# Patient Record
Sex: MALE | Race: WHITE | Employment: STUDENT | URBAN - METROPOLITAN AREA
[De-identification: names, ages, dates, MRNs, and addresses within clinical notes are randomized per-mention and may not be internally consistent; named-entity substitution may affect disease eponyms.]

---

## 2017-01-13 ENCOUNTER — TELEPHONE (OUTPATIENT)
Dept: INTERNAL MEDICINE CLINIC | Age: 27
End: 2017-01-13

## 2017-06-01 ENCOUNTER — OFFICE VISIT (OUTPATIENT)
Dept: INTERNAL MEDICINE CLINIC | Age: 27
End: 2017-06-01

## 2017-06-01 VITALS
OXYGEN SATURATION: 99 % | HEIGHT: 71 IN | WEIGHT: 185 LBS | SYSTOLIC BLOOD PRESSURE: 136 MMHG | TEMPERATURE: 97.4 F | HEART RATE: 75 BPM | DIASTOLIC BLOOD PRESSURE: 81 MMHG | BODY MASS INDEX: 25.9 KG/M2 | RESPIRATION RATE: 18 BRPM

## 2017-06-01 DIAGNOSIS — R03.0 BORDERLINE HYPERTENSION: Primary | Chronic | ICD-10-CM

## 2017-06-01 NOTE — PROGRESS NOTES
HISTORY OF PRESENT ILLNESS  Caro Patino is a 32 y.o. male. HPI  . Problem follow up:  Caro Patino returns for follow up visit regarding borderline HTN. he was seen 6 months ago in office diagnosed with same and treated with lifestyle mod, . Workup was significant for   Lab Results   Component Value Date/Time    Sodium 139 01/04/2017 07:51 AM    Potassium 4.7 01/04/2017 07:51 AM    Chloride 98 01/04/2017 07:51 AM    CO2 26 01/04/2017 07:51 AM    Glucose 91 01/04/2017 07:51 AM    BUN 12 01/04/2017 07:51 AM    Creatinine 0.86 01/04/2017 07:51 AM    BUN/Creatinine ratio 14 01/04/2017 07:51 AM    GFR est  01/04/2017 07:51 AM    GFR est non- 01/04/2017 07:51 AM    Calcium 9.8 01/04/2017 07:51 AM     .  Notes, labs, studies, imaging related to this problem during prior visit were available . Since that visit, he has not changed. he has been compliant with prescribed treatment. Residual symptoms include: none. No chest pain, shortness of breath, dizziness, edema, headaches  New issues associated with this problem include: none. BP's at home earlier in year were 130's/70-80's    ROS    Physical Exam  Visit Vitals    /81 (BP 1 Location: Left arm, BP Patient Position: Sitting)    Pulse 75    Temp 97.4 °F (36.3 °C) (Oral)    Resp 18    Ht 5' 11\" (1.803 m)    Wt 185 lb (83.9 kg)    SpO2 99%    BMI 25.8 kg/m2       ASSESSMENT and PLAN  Zhanna Wright was seen today for elevated blood pressure. Diagnoses and all orders for this visit:    Borderline hypertension  The patient is advised to continue progressive daily aerobic exercise program and reduce salt in diet and cooking. Log blood pressures at home while sitting, relaxed 1 times monthly and bring to next visit. Pt educated on goal BP of 130/80 on average or lower.   Call office as soon as possible if BP's over 140/90 or below 110/50 on multiple occasions and/or with symptoms of dizziness, chest pain, shortness of breath, headache or ankle swelling. Recheck log and bp here in 6 month(s).       Follow-up Disposition:  Return in about 6 months (around 12/1/2017) for physical.

## 2017-06-01 NOTE — MR AVS SNAPSHOT
Visit Information Date & Time Provider Department Dept. Phone Encounter #  
 6/1/2017  7:45 AM Breana Anthony MD [facility] [address] [id number] Follow-up Instructions Return in about 6 months (around 12/1/2017) for physical.  
  
Upcoming Health Maintenance Date Due DTaP/Tdap/Td series (1 - Tdap) 1/29/2011 INFLUENZA AGE 9 TO ADULT 8/1/2017 Allergies as of 6/1/2017  Review Complete On: 12/1/2016 By: Breana Anthony MD  
 No Known Allergies Current Immunizations  Reviewed on 12/1/2016 Name Date Influenza Vaccine 10/26/2016 Not reviewed this visit You Were Diagnosed With   
  
 Codes Comments Borderline hypertension    -  Primary ICD-10-CM: R03.0 ICD-9-CM: 796.2 Vitals BP Pulse Temp Resp Height(growth percentile) Weight(growth percentile) 136/81 (BP 1 Location: Left arm, BP Patient Position: Sitting) 75 97.4 °F (36.3 °C) (Oral) 18 5' 11\" (1.803 m) 185 lb (83.9 kg) SpO2 BMI Smoking Status 99% 25.8 kg/m2 Never Smoker Vitals History BMI and BSA Data Body Mass Index Body Surface Area  
 25.8 kg/m 2 2.05 m 2 Preferred Pharmacy Pharmacy Name Phone [address] [facility] [address] AT [facility] [phone] Your Updated Medication List  
  
   
This list is accurate as of: 6/1/17  8:08 AM.  Always use your most recent med list.  
  
  
  
  
 Cetirizine 10 mg Cap Take 10 mg by mouth two (2) times a day. Indications: CHRONIC IDIOPATHIC URTICARIA  
  
 multivitamin tablet Commonly known as:  ONE A DAY Take 1 Tab by mouth daily. ZANTAC 150 mg tablet Generic drug:  raNITIdine Take 150 mg by mouth daily as needed for Indigestion. Follow-up Instructions Return in about 6 months (around 12/1/2017) for physical.  
  
  
Introducing [facility]! [employer] introduces Navidog patient portal. Now you can access parts of your medical record, email your doctor's office, and request medication refills online. 1. In your internet browser, go to [email or web address] 2. Click on the First Time User? Click Here link in the Sign In box. You will see the New Member Sign Up page. 3. Enter your Navidog Access Code exactly as it appears below. You will not need to use this code after youve completed the sign-up process. If you do not sign up before the expiration date, you must request a new code. · Navidog Access Code: [email or web address] Expires: 8/30/2017  8:07 AM 
 
4. Enter the last four digits of your Social Security Number (xxxx) and Date of Birth (mm/dd/yyyy) as indicated and click Submit. You will be taken to the next sign-up page. 5. Create a Navidog ID. This will be your Navidog login ID and cannot be changed, so think of one that is secure and easy to remember. 6. Create a Navidog password. You can change your password at any time. 7. Enter your Password Reset Question and Answer. This can be used at a later time if you forget your password. 8. Enter your e-mail address. You will receive e-mail notification when new information is available in [address] 9. Click Sign Up. You can now view and download portions of your medical record. 10. Click the Download Summary menu link to download a portable copy of your medical information. If you have questions, please visit the Frequently Asked Questions section of the Navidog website. Remember, Navidog is NOT to be used for urgent needs. For medical emergencies, dial 911. Now available from your iPhone and Android! Please provide this summary of care documentation to your next provider. Your primary care clinician is listed as Nahum Rapp. If you have any questions after today's visit, please call [phone].

## 2017-12-05 ENCOUNTER — OFFICE VISIT (OUTPATIENT)
Dept: INTERNAL MEDICINE CLINIC | Age: 27
End: 2017-12-05

## 2017-12-05 VITALS
TEMPERATURE: 98.7 F | HEART RATE: 59 BPM | BODY MASS INDEX: 26.62 KG/M2 | OXYGEN SATURATION: 98 % | DIASTOLIC BLOOD PRESSURE: 79 MMHG | SYSTOLIC BLOOD PRESSURE: 132 MMHG | HEIGHT: 71 IN | WEIGHT: 190.13 LBS | RESPIRATION RATE: 18 BRPM

## 2017-12-05 DIAGNOSIS — Z23 ENCOUNTER FOR IMMUNIZATION: ICD-10-CM

## 2017-12-05 DIAGNOSIS — Z00.00 PREVENTATIVE HEALTH CARE: Primary | ICD-10-CM

## 2017-12-05 NOTE — MR AVS SNAPSHOT
Visit Information Date & Time Provider Department Dept. Phone Encounter #  
 12/5/2017  9:15 AM Lea Cortez MD [facility] [address] [id number] Follow-up Instructions Return in about 6 months (around 6/5/2018). Upcoming Health Maintenance Date Due DTaP/Tdap/Td series (1 - Tdap) 1/29/2011 Influenza Age 5 to Adult 8/1/2017 Allergies as of 12/5/2017  Review Complete On: 12/5/2017 By: Lea Cortez MD  
 No Known Allergies Current Immunizations  Reviewed on 12/5/2017 Name Date Influenza Vaccine 10/1/2017, 10/26/2016 Tdap  Incomplete Reviewed by Lea Cortez MD on 12/5/2017 at  9:44 AM  
 Reviewed by Lea Cortez MD on 12/5/2017 at  9:44 AM  
You Were Diagnosed With   
  
 Codes Comments Preventative health care    -  Primary ICD-10-CM: Z00.00 ICD-9-CM: V70.0 Encounter for immunization     ICD-10-CM: K55 ICD-9-CM: V03.89 Vitals BP Pulse Temp Resp Height(growth percentile) Weight(growth percentile) 132/79 (BP 1 Location: Left arm, BP Patient Position: Sitting) (!) 59 98.7 °F (37.1 °C) (Oral) 18 5' 11\" (1.803 m) 190 lb 2 oz (86.2 kg) SpO2 BMI Smoking Status 98% 26.52 kg/m2 Never Smoker Vitals History BMI and BSA Data Body Mass Index Body Surface Area  
 26.52 kg/m 2 2.08 m 2 Preferred Pharmacy Pharmacy Name Phone [id number], [address] [facility] [address] [phone] Your Updated Medication List  
  
   
This list is accurate as of: 12/5/17  9:55 AM.  Always use your most recent med list.  
  
  
  
  
 Cetirizine 10 mg Cap Take 10 mg by mouth two (2) times a day. Indications: CHRONIC IDIOPATHIC URTICARIA  
  
 multivitamin tablet Commonly known as:  ONE A DAY Take 1 Tab by mouth daily. ZANTAC 150 mg tablet Generic drug:  raNITIdine Take 150 mg by mouth daily as needed for Indigestion. We Performed the Following LIPID PANEL [72814 CPT(R)] METABOLIC PANEL, BASIC [82992 CPT(R)] TETANUS, DIPHTHERIA TOXOIDS AND ACELLULAR PERTUSSIS VACCINE (TDAP), IN INDIVIDS. >=7, IM [id number] CPT(R)] Follow-up Instructions Return in about 6 months (around 6/5/2018). Patient Instructions Well Visit, Ages 25 to 48: Care Instructions Your Care Instructions Physical exams can help you stay healthy. Your doctor has checked your overall health and may have suggested ways to take good care of yourself. He or she also may have recommended tests. At home, you can help prevent illness with healthy eating, regular exercise, and other steps. Follow-up care is a key part of your treatment and safety. Be sure to make and go to all appointments, and call your doctor if you are having problems. It's also a good idea to know your test results and keep a list of the medicines you take. How can you care for yourself at home? · Reach and stay at a healthy weight. This will lower your risk for many problems, such as obesity, diabetes, heart disease, and high blood pressure. · Get at least 30 minutes of physical activity on most days of the week. Walking is a good choice. You also may want to do other activities, such as running, swimming, cycling, or playing tennis or team sports. Discuss any changes in your exercise program with your doctor. · Do not smoke or allow others to smoke around you. If you need help quitting, talk to your doctor about stop-smoking programs and medicines. These can increase your chances of quitting for good. · Talk to your doctor about whether you have any risk factors for sexually transmitted infections (STIs). Having one sex partner (who does not have STIs and does not have sex with anyone else) is a good way to avoid these infections. · Use birth control if you do not want to have children at this time. Talk with your doctor about the choices available and what might be best for you. · Protect your skin from too much sun. When you're outdoors from 10 a.m. to 4 p.m., stay in the shade or cover up with clothing and a hat with a wide brim. Wear sunglasses that block UV rays. Even when it's cloudy, put broad-spectrum sunscreen (SPF 30 or higher) on any exposed skin. · See a dentist one or two times a year for checkups and to have your teeth cleaned. · Wear a seat belt in the car. · Drink alcohol in moderation, if at all. That means no more than 2 drinks a day for men and 1 drink a day for women. Follow your doctor's advice about when to have certain tests. These tests can spot problems early. For everyone · Cholesterol. Have the fat (cholesterol) in your blood tested after age 21. Your doctor will tell you how often to have this done based on your age, family history, or other things that can increase your risk for heart disease. · Blood pressure. Have your blood pressure checked during a routine doctor visit. Your doctor will tell you how often to check your blood pressure based on your age, your blood pressure results, and other factors. · Vision. Talk with your doctor about how often to have a glaucoma test. 
· Diabetes. Ask your doctor whether you should have tests for diabetes. · Colon cancer. Have a test for colon cancer at age 48. You may have one of several tests. If you are younger than 48, you may need a test earlier if you have any risk factors. Risk factors include whether you already had a precancerous polyp removed from your colon or whether your parent, brother, sister, or child has had colon cancer. For women · Breast exam and mammogram. Talk to your doctor about when you should have a clinical breast exam and a mammogram. Medical experts differ on whether and how often women under 50 should have these tests.  Your doctor can help you decide what is right for you. · Pap test and pelvic exam. Begin Pap tests at age 24. A Pap test is the best way to find cervical cancer. The test often is part of a pelvic exam. Ask how often to have this test. 
· Tests for sexually transmitted infections (STIs). Ask whether you should have tests for STIs. You may be at risk if you have sex with more than one person, especially if your partners do not wear condoms. For men · Tests for sexually transmitted infections (STIs). Ask whether you should have tests for STIs. You may be at risk if you have sex with more than one person, especially if you do not wear a condom. · Testicular cancer exam. Ask your doctor whether you should check your testicles regularly. · Prostate exam. Talk to your doctor about whether you should have a blood test (called a PSA test) for prostate cancer. Experts differ on whether and when men should have this test. Some experts suggest it if you are older than 39 and are -American or have a father or brother who got prostate cancer when he was younger than 72. When should you call for help? Watch closely for changes in your health, and be sure to contact your doctor if you have any problems or symptoms that concern you. Where can you learn more? Go to http://dusty-tristen.info/. Enter P072 in the search box to learn more about \"Well Visit, Ages 25 to 48: Care Instructions. \" Current as of: May 12, 2017 Content Version: 11.4 © 3294-0822 Healthwise, Incorporated. Care instructions adapted under license by [employer] (which disclaims liability or warranty for this information). If you have questions about a medical condition or this instruction, always ask your healthcare professional. [place] [address] any warranty or liability for your use of this information. Introducing [facility]!    
 [facility] introduces VoyageByMe patient portal. Now you can access parts of your medical record, email your doctor's office, and request medication refills online. 1. In your internet browser, go to [email or web address] 2. Click on the First Time User? Click Here link in the Sign In box. You will see the New Member Sign Up page. 3. Enter your TapZen Access Code exactly as it appears below. You will not need to use this code after youve completed the sign-up process. If you do not sign up before the expiration date, you must request a new code. · TapZen Access Code: [email or web address] Expires: 3/5/2018  9:55 AM 
 
4. Enter the last four digits of your Social Security Number (xxxx) and Date of Birth (mm/dd/yyyy) as indicated and click Submit. You will be taken to the next sign-up page. 5. Create a TapZen ID. This will be your TapZen login ID and cannot be changed, so think of one that is secure and easy to remember. 6. Create a TapZen password. You can change your password at any time. 7. Enter your Password Reset Question and Answer. This can be used at a later time if you forget your password. 8. Enter your e-mail address. You will receive e-mail notification when new information is available in [address] 9. Click Sign Up. You can now view and download portions of your medical record. 10. Click the Download Summary menu link to download a portable copy of your medical information. If you have questions, please visit the Frequently Asked Questions section of the TapZen website. Remember, TapZen is NOT to be used for urgent needs. For medical emergencies, dial 911. Now available from your iPhone and Android! Please provide this summary of care documentation to your next provider. Your primary care clinician is listed as Zion Salvador. If you have any questions after today's visit, please call [phone].

## 2017-12-05 NOTE — PATIENT INSTRUCTIONS

## 2017-12-05 NOTE — PROGRESS NOTES
HISTORY OF PRESENT ILLNESS  Mary Montesinos is a 32 y.o. male. HPI  Mary Montesinos is here for complete health maintenance physical exam and screening. he does not have other concerns. Health maintenance hx includes:  Exercise: very active. Form of exercise: running, soccer,,wts   Diet: generally follows a low fat low cholesterol diet, exercises regularly, nonsmoker  CPA        Lab Results   Component Value Date/Time    Cholesterol, total 191 01/04/2017 07:51 AM    HDL Cholesterol 52 01/04/2017 07:51 AM    LDL, calculated 119 01/04/2017 07:51 AM    VLDL, calculated 20 01/04/2017 07:51 AM    Triglyceride 102 01/04/2017 07:51 AM       Lab Results   Component Value Date/Time    Glucose 91 01/04/2017 07:51 AM         Immunizations:     Immunization History   Administered Date(s) Administered    Influenza Vaccine 10/26/2016, 10/01/2017      Immunization status: due today. Social History     Social History    Marital status: SINGLE     Spouse name: N/A    Number of children: N/A    Years of education: N/A     Occupational History    Not on file.      Social History Main Topics    Smoking status: Never Smoker    Smokeless tobacco: Never Used    Alcohol use 5.4 - 7.2 oz/week     2 Glasses of wine, 7 - 10 Cans of beer per week    Drug use: No    Sexual activity: Yes     Partners: Female     Other Topics Concern    Not on file     Social History Narrative    ** Merged History Encounter **          Past Surgical History:   Procedure Laterality Date    HX HEENT      wisdom teeth    HX ORTHOPAEDIC Left 2006    L knee meniscu repair     Family History   Problem Relation Age of Onset    Hypertension Mother     Hypertension Father     Cancer Father      Skin    Hypertension Brother     Cancer Paternal Aunt      Breast    Stroke Paternal Uncle     Cancer Paternal Grandmother      leukemia    Heart Disease Paternal Grandfather 39     Heart Attack    Alcohol abuse Paternal Grandfather     Parkinson's Disease Paternal Uncle     Cancer Maternal Grandmother      ovarian    Alzheimer Maternal Grandfather     Alcohol abuse Other     Heart Disease Other      premature CAD     Current Outpatient Prescriptions on File Prior to Visit   Medication Sig Dispense Refill    raNITIdine (ZANTAC) 150 mg tablet Take 150 mg by mouth daily as needed for Indigestion.  multivitamin (ONE A DAY) tablet Take 1 Tab by mouth daily.  Cetirizine 10 mg Cap Take 10 mg by mouth two (2) times a day. Indications: CHRONIC IDIOPATHIC URTICARIA       No current facility-administered medications on file prior to visit. .    Review of Systems   Constitutional: Negative for malaise/fatigue and weight loss. Eyes: Negative for blurred vision and pain. Respiratory: Negative for cough, shortness of breath and wheezing. Cardiovascular: Negative for chest pain, palpitations and leg swelling. Gastrointestinal: Negative for blood in stool, constipation, diarrhea, heartburn, nausea and vomiting. Genitourinary: Negative. Musculoskeletal: Negative for back pain, joint pain and myalgias. Skin: Negative for rash. Neurological: Negative for dizziness and headaches. Endo/Heme/Allergies: Negative for environmental allergies. Does not bruise/bleed easily. Psychiatric/Behavioral: Negative for depression. The patient is not nervous/anxious and does not have insomnia. Physical Exam   Constitutional: He is oriented to person, place, and time. He appears well-developed and well-nourished. No distress. Body mass index is 26.52 kg/(m^2). /79 (BP 1 Location: Left arm, BP Patient Position: Sitting)  Pulse (!) 59  Temp 98.7 °F (37.1 °C) (Oral)   Resp 18  Ht 5' 11\" (1.803 m)  Wt 190 lb 2 oz (86.2 kg)  SpO2 98%  BMI 26.52 kg/m2   HENT:   Head: Normocephalic and atraumatic.    Right Ear: Hearing normal.   Left Ear: Hearing normal.   Nose: Nose normal.   Mouth/Throat: Oropharynx is clear and moist and mucous membranes are normal. Normal dentition. Eyes: Conjunctivae and lids are normal. Pupils are equal, round, and reactive to light. Right eye exhibits no discharge. Left eye exhibits no discharge. No scleral icterus. Neck: Trachea normal. No thyromegaly present. Cardiovascular: Normal rate, regular rhythm, normal heart sounds, intact distal pulses and normal pulses. Exam reveals no gallop and no friction rub. No murmur heard. Pulmonary/Chest: Effort normal and breath sounds normal. No respiratory distress. Abdominal: Soft. Normal appearance and bowel sounds are normal. He exhibits no distension and no mass. There is no hepatosplenomegaly. There is no tenderness. There is no CVA tenderness. Musculoskeletal: Normal range of motion. He exhibits no edema or tenderness. Lymphadenopathy:     He has no cervical adenopathy. Neurological: He is alert and oriented to person, place, and time. Skin: Skin is warm and dry. No rash noted. He is not diaphoretic. Psychiatric: He has a normal mood and affect. His speech is normal and behavior is normal. Judgment and thought content normal. Cognition and memory are normal.   Nursing note and vitals reviewed. ASSESSMENT and PLAN  Diagnoses and all orders for this visit:    1. Preventative health care  -     METABOLIC PANEL, BASIC  -     LIPID PANEL  Carlitos Hartley was counseled on age-appropriate/ guideline-based risk prevention behaviors and screening for a 32y.o. year old   male . We also discussed adjustments in screening based on family history if necessary. Printed instructions for preventative screening guidelines and healthy behaviors given to patient with after visit summary. 2. Encounter for immunization  -     Tetanus, diphtheria toxoids and acellular pertussis (TDAP) vaccine, in individuals >=7 years, IM      Follow-up Disposition:  Return in about 6 months (around 6/5/2018).

## 2018-06-05 ENCOUNTER — OFFICE VISIT (OUTPATIENT)
Dept: INTERNAL MEDICINE CLINIC | Age: 28
End: 2018-06-05

## 2018-06-05 VITALS
DIASTOLIC BLOOD PRESSURE: 80 MMHG | BODY MASS INDEX: 27.16 KG/M2 | RESPIRATION RATE: 18 BRPM | TEMPERATURE: 98.6 F | SYSTOLIC BLOOD PRESSURE: 124 MMHG | HEART RATE: 61 BPM | OXYGEN SATURATION: 100 % | HEIGHT: 71 IN | WEIGHT: 194 LBS

## 2018-06-05 DIAGNOSIS — R03.0 BORDERLINE HYPERTENSION: Primary | ICD-10-CM

## 2018-06-05 NOTE — MR AVS SNAPSHOT
[facility] 
 
 
 [address] [facility] 
[phone] Patient: Hailey Espinal MRN: [id number] URN:[id number] Visit Information Date & Time Provider Department Dept. Phone Encounter #  
 6/5/2018  7:45 AM Yuliet Hong MD [facility] [id number] Follow-up Instructions Return in about 6 months (around 12/5/2018). Upcoming Health Maintenance Date Due Influenza Age 5 to Adult 8/1/2018 DTaP/Tdap/Td series (2 - Td) 12/5/2027 Allergies as of 6/5/2018  Review Complete On: 6/5/2018 By: Rozina Ansari LPN No Known Allergies Current Immunizations  Reviewed on 12/5/2017 Name Date Influenza Vaccine 10/1/2017, 10/26/2016 Tdap 12/5/2017 Not reviewed this visit You Were Diagnosed With   
  
 Codes Comments Borderline hypertension    -  Primary ICD-10-CM: R03.0 ICD-9-CM: 796.2 Vitals BP Pulse Temp Resp Height(growth percentile) Weight(growth percentile) 124/80 61 98.6 °F (37 °C) (Oral) 18 5' 11\" (1.803 m) 194 lb (88 kg) SpO2 BMI Smoking Status 100% 27.06 kg/m2 Never Smoker Vitals History BMI and BSA Data Body Mass Index Body Surface Area  
 27.06 kg/m 2 2.1 m 2 Preferred Pharmacy Pharmacy Name Phone [id number] [place], [address] [facility], [facility] 1 [phone] Your Updated Medication List  
  
   
This list is accurate as of 6/5/18  8:10 AM.  Always use your most recent med list.  
  
  
  
  
 Cetirizine 10 mg Cap Take 10 mg by mouth two (2) times a day. Indications: CHRONIC IDIOPATHIC URTICARIA  
  
 multivitamin tablet Commonly known as:  ONE A DAY Take 1 Tab by mouth daily. ZANTAC 150 mg tablet Generic drug:  raNITIdine Take 150 mg by mouth daily as needed for Indigestion. We Performed the Following METABOLIC PANEL, BASIC [49532 CPT(R)] Follow-up Instructions Return in about 6 months (around 12/5/2018). Introducing hospitals & HEALTH SERVICES! Alverto Ignacio introduces [employer] patient portal. Now you can access parts of your medical record, email your doctor's office, and request medication refills online. 1. In your internet browser, go to [email or web address] 2. Click on the First Time User? Click Here link in the Sign In box. You will see the New Member Sign Up page. 3. Enter your [employer] Access Code exactly as it appears below. You will not need to use this code after youve completed the sign-up process. If you do not sign up before the expiration date, you must request a new code. · [employer] Access Code: [email or web address] Expires: 9/3/2018  8:10 AM 
 
4. Enter the last four digits of your Social Security Number (xxxx) and Date of Birth (mm/dd/yyyy) as indicated and click Submit. You will be taken to the next sign-up page. 5. Create a [employer] ID. This will be your [employer] login ID and cannot be changed, so think of one that is secure and easy to remember. 6. Create a [employer] password. You can change your password at any time. 7. Enter your Password Reset Question and Answer. This can be used at a later time if you forget your password. 8. Enter your e-mail address. You will receive e-mail notification when new information is available in [address] 9. Click Sign Up. You can now view and download portions of your medical record. 10. Click the Download Summary menu link to download a portable copy of your medical information. If you have questions, please visit the Frequently Asked Questions section of the [employer] website. Remember, [employer] is NOT to be used for urgent needs. For medical emergencies, dial 911. Now available from your iPhone and Android! Please provide this summary of care documentation to your next provider. Your primary care clinician is listed as Roberth Murphy. If you have any questions after today's visit, please call [phone].

## 2018-06-05 NOTE — PROGRESS NOTES
HISTORY OF PRESENT ILLNESS  Sabrina Chaidez is a 29 y.o. male. HPI  Hypertension:  Sabrina Chaidez is a 29 y.o. male with hypertension. without Chronic kidney disease stage    Medication change since last visit: NA  The patient reports:  patient does not perform home BP monitoring, no chest pain on exertion, no dyspnea on exertion, no swelling of ankles, no orthostatic dizziness or lightheadedness, no palpitations. Lifestyle modification/social history: generally follows a low fat low cholesterol diet, exercises regularly, nonsmoker    Lab Results   Component Value Date/Time    Sodium 139 01/04/2017 07:51 AM    Potassium 4.7 01/04/2017 07:51 AM    Chloride 98 01/04/2017 07:51 AM    CO2 26 01/04/2017 07:51 AM    Glucose 91 01/04/2017 07:51 AM    BUN 12 01/04/2017 07:51 AM    Creatinine 0.86 01/04/2017 07:51 AM    BUN/Creatinine ratio 14 01/04/2017 07:51 AM    GFR est  01/04/2017 07:51 AM    GFR est non- 01/04/2017 07:51 AM    Calcium 9.8 01/04/2017 07:51 AM           ROS    Physical Exam   Constitutional: He appears well-developed and well-nourished. No distress. /80  Pulse 61  Temp 98.6 °F (37 °C) (Oral)   Resp 18  Ht 5' 11\" (1.803 m)  Wt 194 lb (88 kg)  SpO2 100%  BMI 27.06 kg/m2Body mass index is 27.06 kg/(m^2). HENT:   Mouth/Throat: Oropharynx is clear and moist.   Neck: No JVD present. Carotid bruit is not present. Cardiovascular: Normal rate, regular rhythm and intact distal pulses. Murmur heard. Pulmonary/Chest: Effort normal and breath sounds normal.   Musculoskeletal: He exhibits no edema. Neurological: He is alert. Skin: Skin is warm and dry. He is not diaphoretic. Nursing note and vitals reviewed. ASSESSMENT and PLAN  Diagnoses and all orders for this visit:    1. Borderline hypertension -  Log blood pressures at home while sitting, relaxed 3-5 times weekly and bring to next visit. Pt educated on goal BP of 130/80 on average or lower.   Call office as soon as possible if BP's over 140/90 or below 110/50 on multiple occasions and/or with symptoms of dizziness, chest pain, shortness of breath, headache or ankle swelling. Recheck log and bp here in 6 month(s).    -     METABOLIC PANEL, BASIC      Follow-up Disposition:  Return in about 6 months (around 12/5/2018).

## 2018-06-06 LAB
BUN SERPL-MCNC: 12 MG/DL (ref 6–20)
BUN/CREAT SERPL: 13 (ref 9–20)
CALCIUM SERPL-MCNC: 9.7 MG/DL (ref 8.7–10.2)
CHLORIDE SERPL-SCNC: 98 MMOL/L (ref 96–106)
CO2 SERPL-SCNC: 25 MMOL/L (ref 18–29)
CREAT SERPL-MCNC: 0.9 MG/DL (ref 0.76–1.27)
GFR SERPLBLD CREATININE-BSD FMLA CKD-EPI: 116 ML/MIN/1.73
GFR SERPLBLD CREATININE-BSD FMLA CKD-EPI: 134 ML/MIN/1.73
GLUCOSE SERPL-MCNC: 82 MG/DL (ref 65–99)
POTASSIUM SERPL-SCNC: 4.4 MMOL/L (ref 3.5–5.2)
SODIUM SERPL-SCNC: 139 MMOL/L (ref 134–144)

## 2019-10-30 ENCOUNTER — TELEPHONE (OUTPATIENT)
Dept: INTERNAL MEDICINE CLINIC | Age: 29
End: 2019-10-30

## 2019-10-30 NOTE — TELEPHONE ENCOUNTER
Rubén Nguyen sent to [id number] [email or web address] Message/Vendor Calls     Caller's first and last name:       Reason for call:   Medical Records     Callback required yes/no and why:   Yes, requesting a return call, regarding medical records being sent to [address], Internal Medicine     Best contact number(s):   [phone]     Details to clarify the request:       Emily Davis Page